# Patient Record
Sex: MALE | Race: WHITE
[De-identification: names, ages, dates, MRNs, and addresses within clinical notes are randomized per-mention and may not be internally consistent; named-entity substitution may affect disease eponyms.]

---

## 2019-08-10 ENCOUNTER — HOSPITAL ENCOUNTER (EMERGENCY)
Dept: HOSPITAL 11 - JP.ED | Age: 64
Discharge: HOME | End: 2019-08-10
Payer: MEDICARE

## 2019-08-10 DIAGNOSIS — Z79.899: ICD-10-CM

## 2019-08-10 DIAGNOSIS — I10: ICD-10-CM

## 2019-08-10 DIAGNOSIS — I25.2: ICD-10-CM

## 2019-08-10 DIAGNOSIS — Z95.810: Primary | ICD-10-CM

## 2019-08-10 PROCEDURE — 99283 EMERGENCY DEPT VISIT LOW MDM: CPT

## 2019-08-10 NOTE — EDM.PDOC
ED HPI GENERAL MEDICAL PROBLEM





- General


Chief Complaint: General


Stated Complaint: HEART???


Time Seen by Provider: 08/10/19 10:40


Source of Information: Reports: Patient, Old Records, RN


History Limitations: Reports: No Limitations





- History of Present Illness


INITIAL COMMENTS - FREE TEXT/NARRATIVE: 





65 yo male presents to the ER due to defibrillator beeping. He lost the cord to 

his testing device that connects to the phone system so couldn't test it 

himself. He called [facility] and was told to come to the ER. The beeping has 

been on and off since yesterday morning. He feels fine. 


Onset: Sudden


Onset Date: 08/09/19


Duration: Day(s): (1+), Intermittent


Location: Reports: Chest


Quality: Reports: Other (no pain)


Severity: Mild


Improves with: Reports: None


Worsens with: Reports: Other (unknown)


Context: Reports: Other (See HPI)


Associated Symptoms: Reports: No Other Symptoms


Treatments PTA: Reports: Other (see below) (none)





- Related Data


 Allergies











Allergy/AdvReac Type Severity Reaction Status Date / Time


 


No Known Allergies Allergy   Verified 08/10/19 09:54











Home Meds: 


 Home Meds





Amiodarone HCl [Pacerone] 200 mg PO DAILY 08/10/19 [History]


Metoprolol Succinate 25 mg PO DAILY 08/10/19 [History]











Past Medical History


Cardiovascular History: Reports: Heart Murmur, Hypertension, MI


Other Cardiovascular History: left bundle branch block





Social & Family History





- Tobacco Use


Smoking Status *Q: Unknown Ever Smoked





ED ROS GENERAL





- Review of Systems


Review Of Systems: See Below


Constitutional: Reports: No Symptoms


HEENT: Reports: No Symptoms


Respiratory: Reports: No Symptoms


Cardiovascular: Reports: No Symptoms


Musculoskeletal: Reports: No Symptoms


Skin: Reports: No Symptoms


Neurological: Reports: No Symptoms


Psychiatric: Reports: No Symptoms





ED EXAM, GENERAL





- Physical Exam


Exam: See Below


Exam Limited By: No Limitations


General Appearance: Alert, WD/WN, No Apparent Distress


Eye Exam: Bilateral Eye: PERRL


Ears: Normal External Exam, Hearing Grossly Normal


Ear Exam: Bilateral Ear: Auricle Normal


Nose: Normal Inspection, No Blood


Throat/Mouth: Normal Inspection, Normal Lips, Normal Oropharynx, Normal Voice, 

No Airway Compromise


Head: Atraumatic, Normocephalic


Neck: Normal Inspection


Respiratory/Chest: No Respiratory Distress, Lungs Clear, Normal Breath Sounds, 

No Accessory Muscle Use


Cardiovascular: Regular Rate, Rhythm, No Edema


GI/Abdominal: Normal Bowel Sounds, Soft, Non-Tender, No Distention


Back Exam: Normal Inspection, CVA Tenderness (R), CVA Tenderness (L)


Extremities: Normal Inspection, Normal Range of Motion, Non-Tender, No Pedal 

Edema


Neurological: Alert, Oriented, CN II-XII Intact, Normal Cognition, No Motor/

Sensory Deficits


Psychiatric: Normal Affect, Normal Mood


Skin Exam: Warm, Dry, Intact, Normal Color, No Rash





Course





- Vital Signs


Text/Narrative:: 





Connected his defib with [employer]ac[employer], no issues discovered.


Last Recorded V/S: 


 Last Vital Signs











Temp  35.2 C L  08/10/19 10:01


 


Pulse  69   08/10/19 10:01


 


Resp  16   08/10/19 10:01


 


BP  147/103 H  08/10/19 10:01


 


Pulse Ox  94 L  08/10/19 10:01














- Orders/Labs/Meds


Meds: 


Medications














Discontinued Medications














Generic Name Dose Route Start Last Admin





  Trade Name Joesph  PRN Reason Stop Dose Admin


 


Calcium Carbonate/Glycine  1,000 mg  08/10/19 10:52  08/10/19 10:56





  Tums  PO  08/10/19 10:53  1,000 mg





  ONETIME ONE   Administration





     





     





     





     














Departure





- Departure


Time of Disposition: 11:00


Disposition: Home, Self-Care 01


Condition: Good


Clinical Impression: 


 Implantable cardioverter-defibrillator (ICD) in situ








- Discharge Information


*PRESCRIPTION DRUG MONITORING PROGRAM REVIEWED*: No


*COPY OF PRESCRIPTION DRUG MONITORING REPORT IN PATIENT IZABEL: No


Referrals: 


PCP,None [Primary Care Provider] - 


Forms:  ED Department Discharge


Additional Instructions: 


Follow up with your cardiologist on Monday, return as needed.

## 2022-05-17 ENCOUNTER — HOSPITAL ENCOUNTER (EMERGENCY)
Dept: HOSPITAL 11 - JP.ED | Age: 67
Discharge: HOME | End: 2022-05-17
Payer: MEDICARE

## 2022-05-17 DIAGNOSIS — Z86.16: ICD-10-CM

## 2022-05-17 DIAGNOSIS — Z79.82: ICD-10-CM

## 2022-05-17 DIAGNOSIS — I25.2: ICD-10-CM

## 2022-05-17 DIAGNOSIS — N39.0: Primary | ICD-10-CM

## 2022-05-17 DIAGNOSIS — I10: ICD-10-CM

## 2022-05-17 DIAGNOSIS — Z79.899: ICD-10-CM
